# Patient Record
Sex: MALE | Race: WHITE | NOT HISPANIC OR LATINO | Employment: OTHER | URBAN - NONMETROPOLITAN AREA
[De-identification: names, ages, dates, MRNs, and addresses within clinical notes are randomized per-mention and may not be internally consistent; named-entity substitution may affect disease eponyms.]

---

## 2017-02-08 ENCOUNTER — CLINICAL SUPPORT (OUTPATIENT)
Dept: CARDIOLOGY | Facility: CLINIC | Age: 82
End: 2017-02-08

## 2017-02-08 DIAGNOSIS — Z95.0 CARDIAC PACEMAKER IN SITU: Primary | ICD-10-CM

## 2017-02-08 DIAGNOSIS — I49.5 SICK SINUS SYNDROME (HCC): ICD-10-CM

## 2017-02-08 PROCEDURE — 93294 REM INTERROG EVL PM/LDLS PM: CPT | Performed by: INTERNAL MEDICINE

## 2017-02-08 PROCEDURE — 93296 REM INTERROG EVL PM/IDS: CPT | Performed by: INTERNAL MEDICINE

## 2017-02-08 NOTE — PROGRESS NOTES
Dual Chamber Pacemaker Evaluation Report  [facility]    February 8, 2017    Primary Cardiologist: Radha  : Medtronic Model: EnRhythm  Implant date: February 7, 2011    Reason for evaluation:routine  Indication for pacemaker: sick sinus syndrome    Measurements  Atrial sensing - P wave: 0.4 mV  Atrial threshold: n/r  Atrial lead impedance: 316 ohms  Ventricular sensing - R wave: 1.4 mV  Ventricular threshold: n/r  Ventricular lead impedance:   352 ohms     Diagnostic Data  Atrial paced: 84.8 %  Ventricular paced: <0.2 %  Other: no new events  Battery status: intensify follow up   2.95V      Final Parameters  Mode:  AAIR+  Lower rate: 60 bpm   Upper rate: 100 bpm  AV Delay: paced- 180 ms  Sensed-150 ms  Atrial - Amplitude: 2 V   Pulse width: 0.4 ms   Sensitivity: 0.15 mV     Ventricular - Amplitude: 3 V  Pulse width: 0.4 ms  Sensitivity: 0.6 mV    Changes made: n/a  Conclusions: normal pacemaker function and stable sensing thresholds    Follow up: 2 months     Reviewed and agree with above.

## 2017-04-17 ENCOUNTER — CLINICAL SUPPORT (OUTPATIENT)
Dept: CARDIOLOGY | Facility: CLINIC | Age: 82
End: 2017-04-17

## 2017-04-17 DIAGNOSIS — Z95.0 CARDIAC PACEMAKER IN SITU: Primary | ICD-10-CM

## 2017-04-17 DIAGNOSIS — I49.5 SICK SINUS SYNDROME (HCC): ICD-10-CM

## 2017-04-17 PROCEDURE — 93296 REM INTERROG EVL PM/IDS: CPT | Performed by: INTERNAL MEDICINE

## 2017-04-17 PROCEDURE — 93294 REM INTERROG EVL PM/LDLS PM: CPT | Performed by: INTERNAL MEDICINE

## 2017-04-17 NOTE — PROGRESS NOTES
Dual Chamber Pacemaker Evaluation Report  [facility]    April 17, 2017    Primary Cardiologist: Radha  : Medtronic Model: EnRhythm  Implant date: February 7, 2011    Reason for evaluation:battery check  Indication for pacemaker: sick sinus syndrome and a-fib    Measurements  Atrial sensing - P wave: 0.3 mV  Atrial threshold: n/r  Atrial lead impedance: 300 ohms  Ventricular sensing - R wave: 1.4 mV  Ventricular threshold: n/r  Ventricular lead impedance:   356 ohms     Diagnostic Data  Atrial paced: 82.4 %  Ventricular paced: <0.2 %  Other: no new events  Battery status: intensify follow up   2.93V      Final Parameters  Mode:  AAIR+  Lower rate: 60 bpm   Upper rate: 100 bpm  AV Delay: paced- 180 ms  Sensed-150 ms  Atrial - Amplitude: 2 V   Pulse width: 0.4 ms   Sensitivity: 0.15 mV     Ventricular - Amplitude: 3 V  Pulse width: 0.4 ms  Sensitivity: 0.6 mV    Changes made: n/a  Conclusions: normal pacemaker function and stable sensing thresholds    Follow up: 2 months

## 2017-05-11 ENCOUNTER — OFFICE VISIT (OUTPATIENT)
Dept: CARDIOLOGY | Facility: CLINIC | Age: 82
End: 2017-05-11

## 2017-05-11 VITALS
SYSTOLIC BLOOD PRESSURE: 160 MMHG | DIASTOLIC BLOOD PRESSURE: 80 MMHG | BODY MASS INDEX: 19.1 KG/M2 | WEIGHT: 126 LBS | HEART RATE: 77 BPM | HEIGHT: 68 IN

## 2017-05-11 DIAGNOSIS — I25.10 CORONARY ARTERY DISEASE INVOLVING NATIVE CORONARY ARTERY OF NATIVE HEART WITHOUT ANGINA PECTORIS: Primary | ICD-10-CM

## 2017-05-11 DIAGNOSIS — I48.20 CHRONIC ATRIAL FIBRILLATION (HCC): ICD-10-CM

## 2017-05-11 DIAGNOSIS — I10 ESSENTIAL HYPERTENSION: ICD-10-CM

## 2017-05-11 DIAGNOSIS — Z86.79 H/O SICK SINUS SYNDROME: ICD-10-CM

## 2017-05-11 PROCEDURE — 93000 ELECTROCARDIOGRAM COMPLETE: CPT | Performed by: INTERNAL MEDICINE

## 2017-05-11 PROCEDURE — 99213 OFFICE O/P EST LOW 20 MIN: CPT | Performed by: INTERNAL MEDICINE

## 2017-06-12 ENCOUNTER — CLINICAL SUPPORT (OUTPATIENT)
Dept: CARDIOLOGY | Facility: CLINIC | Age: 82
End: 2017-06-12

## 2017-06-12 DIAGNOSIS — Z95.0 CARDIAC PACEMAKER IN SITU: Primary | ICD-10-CM

## 2017-06-12 DIAGNOSIS — I48.20 CHRONIC ATRIAL FIBRILLATION (HCC): ICD-10-CM

## 2017-06-12 DIAGNOSIS — I49.5 SSS (SICK SINUS SYNDROME) (HCC): ICD-10-CM

## 2017-06-12 PROCEDURE — 93288 INTERROG EVL PM/LDLS PM IP: CPT | Performed by: INTERNAL MEDICINE

## 2017-06-12 NOTE — PROGRESS NOTES
Dual Chamber Pacemaker Evaluation Report  In office    June 12, 2017    Primary Cardiologist: Radha  : Medtronic Model: EnRhythm  Implant date: Feburary 7, 2011    Reason for evaluation: battery check  Indication for pacemaker: sick sinus syndrome and a-fib    Measurements  n/r    Diagnostic Data  Atrial paced: 69.5 %  Ventricular paced: 2.6 %  Other: 67 a-fib episodes- longest 4 min   meds include coumadin  Battery status: elective replacement time   Triggered May 16, 2017      Final Parameters  Mode:  VVI  Lower rate: 65 bpm     Atrial - Amplitude: n/a V   Pulse width: n/a ms   Sensitivity: 0.15 mV     Ventricular - Amplitude: 3 V  Pulse width: 0.4 ms  Sensitivity: 0.6 mV    Changes made: none  Conclusions: battery at elective replacement voltage    Follow up: for generator change

## 2017-06-19 ENCOUNTER — HOSPITAL ENCOUNTER (OUTPATIENT)
Dept: CARDIOLOGY | Facility: HOSPITAL | Age: 82
Discharge: HOME | End: 2017-06-19
Admitting: INTERNAL MEDICINE

## 2017-06-19 DIAGNOSIS — Z86.79 H/O SICK SINUS SYNDROME: Primary | ICD-10-CM

## 2017-06-19 LAB
ALBUMIN SERPL-MCNC: 3.6 G/DL (ref 3.5–5)
ALBUMIN/GLOB SERPL: 1.1 G/DL (ref 1.1–2.5)
ALP SERPL-CCNC: 90 U/L (ref 24–120)
ALT SERPL W P-5'-P-CCNC: 37 U/L (ref 0–54)
ANION GAP SERPL CALCULATED.3IONS-SCNC: 12 MMOL/L (ref 4–13)
AST SERPL-CCNC: 48 U/L (ref 7–45)
BILIRUB SERPL-MCNC: 1 MG/DL (ref 0.1–1)
BUN BLD-MCNC: 23 MG/DL (ref 5–21)
BUN/CREAT SERPL: 18.3 (ref 7–25)
CALCIUM SPEC-SCNC: 9.2 MG/DL (ref 8.4–10.4)
CHLORIDE SERPL-SCNC: 111 MMOL/L (ref 98–110)
CO2 SERPL-SCNC: 24 MMOL/L (ref 24–31)
CREAT BLD-MCNC: 1.26 MG/DL (ref 0.5–1.4)
DEPRECATED RDW RBC AUTO: 52.8 FL (ref 40–54)
ERYTHROCYTE [DISTWIDTH] IN BLOOD BY AUTOMATED COUNT: 15.3 % (ref 12–15)
GFR SERPL CREATININE-BSD FRML MDRD: 55 ML/MIN/1.73
GLOBULIN UR ELPH-MCNC: 3.3 GM/DL
GLUCOSE BLD-MCNC: 87 MG/DL (ref 70–100)
HCT VFR BLD AUTO: 34.3 % (ref 40–52)
HGB BLD-MCNC: 10.8 G/DL (ref 14–18)
MCH RBC QN AUTO: 29.9 PG (ref 28–32)
MCHC RBC AUTO-ENTMCNC: 31.5 G/DL (ref 33–36)
MCV RBC AUTO: 95 FL (ref 82–95)
PLATELET # BLD AUTO: 211 10*3/MM3 (ref 130–400)
PMV BLD AUTO: 10.5 FL (ref 6–12)
POTASSIUM BLD-SCNC: 4.2 MMOL/L (ref 3.5–5.3)
PROT SERPL-MCNC: 6.9 G/DL (ref 6.3–8.7)
RBC # BLD AUTO: 3.61 10*6/MM3 (ref 4.8–5.9)
SODIUM BLD-SCNC: 147 MMOL/L (ref 135–145)
WBC NRBC COR # BLD: 5.84 10*3/MM3 (ref 4.8–10.8)

## 2017-06-19 PROCEDURE — 36415 COLL VENOUS BLD VENIPUNCTURE: CPT

## 2017-06-19 PROCEDURE — 80053 COMPREHEN METABOLIC PANEL: CPT | Performed by: INTERNAL MEDICINE

## 2017-06-19 PROCEDURE — 85027 COMPLETE CBC AUTOMATED: CPT | Performed by: INTERNAL MEDICINE

## 2017-06-21 ENCOUNTER — HOSPITAL ENCOUNTER (OUTPATIENT)
Facility: HOSPITAL | Age: 82
Setting detail: HOSPITAL OUTPATIENT SURGERY
Discharge: HOME | End: 2017-06-21
Attending: INTERNAL MEDICINE | Admitting: INTERNAL MEDICINE

## 2017-06-21 VITALS
BODY MASS INDEX: 19.55 KG/M2 | OXYGEN SATURATION: 94 % | HEART RATE: 62 BPM | SYSTOLIC BLOOD PRESSURE: 151 MMHG | RESPIRATION RATE: 15 BRPM | WEIGHT: 132 LBS | TEMPERATURE: 98.4 F | HEIGHT: 69 IN | DIASTOLIC BLOOD PRESSURE: 72 MMHG

## 2017-06-21 DIAGNOSIS — I49.5 SSS (SICK SINUS SYNDROME) (HCC): ICD-10-CM

## 2017-06-21 LAB
INR PPP: 1.1 (ref 0.91–1.09)
PROTHROMBIN TIME: 13.5 SECONDS (ref 10–13.8)

## 2017-06-21 PROCEDURE — 25010000002 MIDAZOLAM PER 1 MG: Performed by: INTERNAL MEDICINE

## 2017-06-21 PROCEDURE — 99152 MOD SED SAME PHYS/QHP 5/>YRS: CPT | Performed by: INTERNAL MEDICINE

## 2017-06-21 PROCEDURE — 25010000002 FENTANYL CITRATE (PF) 100 MCG/2ML SOLUTION: Performed by: INTERNAL MEDICINE

## 2017-06-21 PROCEDURE — 33228 REMV&REPLC PM GEN DUAL LEAD: CPT | Performed by: INTERNAL MEDICINE

## 2017-06-21 PROCEDURE — C1785 PMKR, DUAL, RATE-RESP: HCPCS | Performed by: INTERNAL MEDICINE

## 2017-06-21 PROCEDURE — S0260 H&P FOR SURGERY: HCPCS | Performed by: INTERNAL MEDICINE

## 2017-06-21 PROCEDURE — 85610 PROTHROMBIN TIME: CPT

## 2017-06-21 DEVICE — GEN PM ADVISA SURESCAN DR MRI: Type: IMPLANTABLE DEVICE | Status: FUNCTIONAL

## 2017-06-21 RX ORDER — SODIUM CHLORIDE 9 MG/ML
50 INJECTION, SOLUTION INTRAVENOUS CONTINUOUS
Status: DISCONTINUED | OUTPATIENT
Start: 2017-06-21 | End: 2017-06-21 | Stop reason: HOSPADM

## 2017-06-21 RX ORDER — FENTANYL CITRATE 50 UG/ML
INJECTION, SOLUTION INTRAMUSCULAR; INTRAVENOUS AS NEEDED
Status: DISCONTINUED | OUTPATIENT
Start: 2017-06-21 | End: 2017-06-21 | Stop reason: HOSPADM

## 2017-06-21 RX ORDER — MIDAZOLAM HYDROCHLORIDE 1 MG/ML
INJECTION INTRAMUSCULAR; INTRAVENOUS AS NEEDED
Status: DISCONTINUED | OUTPATIENT
Start: 2017-06-21 | End: 2017-06-21 | Stop reason: HOSPADM

## 2017-06-21 RX ORDER — SODIUM CHLORIDE 0.9 % (FLUSH) 0.9 %
1-10 SYRINGE (ML) INJECTION AS NEEDED
Status: DISCONTINUED | OUTPATIENT
Start: 2017-06-21 | End: 2017-06-21 | Stop reason: HOSPADM

## 2017-06-21 RX ORDER — LIDOCAINE HYDROCHLORIDE 20 MG/ML
INJECTION, SOLUTION INFILTRATION; PERINEURAL AS NEEDED
Status: DISCONTINUED | OUTPATIENT
Start: 2017-06-21 | End: 2017-06-21 | Stop reason: HOSPADM

## 2017-06-21 RX ADMIN — SODIUM CHLORIDE 50 ML/HR: 9 INJECTION, SOLUTION INTRAVENOUS at 11:42

## 2017-06-21 RX ADMIN — CEFAZOLIN 1 G: 1 INJECTION, POWDER, FOR SOLUTION INTRAMUSCULAR; INTRAVENOUS; PARENTERAL at 13:17

## 2017-06-21 RX ADMIN — Medication: at 11:42

## 2017-06-21 NOTE — H&P
"Chief Complaint:  SSS - pacemaker generator at [facility]    HPI: Patient with SSS and pacer in place - generator at [facility] - no other complaints or changes since I last saw on 5/11/2017.    I have reviewed all elements of the patient's past medical, past surgical, home medications, allergies, family and social history with the patient and updated these in the medical record as needed.    I personally performed a 14 point review of systems and found this to be negative except as otherwise noted in the HPI.    O:  /68 (BP Location: Left arm, Patient Position: Lying)  Pulse 65  Temp 98.4 °F (36.9 °C) (Temporal Artery )   Resp 16  Ht 69\" (175.3 cm)  Wt 132 lb (59.9 kg)  SpO2 94%  BMI 19.49 kg/m2    Gen.: Awake alert and oriented ×3 and in no acute distress  HEENT: Normocephalic, atraumatic, pupils equally round and reactive to light, oropharynx clear, mucous membranes moist  Neck: Supple, no elevation of JVP, no thyromegaly, no carotid bruits  CV: Regular rate and rhythm, no audible murmurs, rubs, gallops  Pulmonary: Clear to auscultation bilaterally, no wheezes, rales  GI: Soft, nontender, nondistended, active bowel sounds  Extremities: Warm and well-perfused, no dermatitis or ulceration, no clubbing, cyanosis, edema  Neurologic: Cranial nerves are grossly intact, patient moves all 4 extremities equally during examination    Diagnostic Data:    Lab Results   Component Value Date    WBC 5.84 06/19/2017    HGB 10.8 (L) 06/19/2017    HCT 34.3 (L) 06/19/2017    MCV 95.0 06/19/2017     06/19/2017     Lab Results   Component Value Date    GLUCOSE 87 06/19/2017    CALCIUM 9.2 06/19/2017     (H) 06/19/2017    K 4.2 06/19/2017    CO2 24.0 06/19/2017     (H) 06/19/2017    BUN 23 (H) 06/19/2017    CREATININE 1.26 06/19/2017    EGFRIFNONA 55 (L) 06/19/2017    BCR 18.3 06/19/2017    ANIONGAP 12.0 06/19/2017     INR 1.1    ASSESSMENT/PLAN:    1. SSS  2. Chronic atrial fib  3. CAD    - Will proceed with " generator change today as planned.

## 2017-08-03 ENCOUNTER — CLINICAL SUPPORT (OUTPATIENT)
Dept: CARDIOLOGY | Facility: CLINIC | Age: 82
End: 2017-08-03

## 2017-08-03 DIAGNOSIS — Z95.0 CARDIAC PACEMAKER IN SITU: Primary | ICD-10-CM

## 2017-08-03 DIAGNOSIS — Z86.79 H/O SICK SINUS SYNDROME: ICD-10-CM

## 2017-08-03 PROCEDURE — 93280 PM DEVICE PROGR EVAL DUAL: CPT | Performed by: INTERNAL MEDICINE

## 2017-08-04 NOTE — PROGRESS NOTES
Dual Chamber Pacemaker Evaluation Report  In office    August 3, 2017    Primary Cardiologist: Radha  : Medtronic Model: Advisa  Implant date: June 21, 2017    Reason for evaluation: generator change follow up  Indication for pacemaker: sick sinus syndrome    Measurements  Atrial sensing - P wave: 0.5 mV  Atrial threshold: 0.5V@ 0.4ms  Atrial lead impedance: 285 ohms  Ventricular sensing - R wave: 1.3 mV  Ventricular threshold: 1.25 V @ 0.4 ms  Ventricular lead impedance:   380 ohms     Diagnostic Data  Atrial paced: 98 %  Ventricular paced: 2.3 %  Other: no new events   Incision WNL  Battery status: satisfactory   Est 9.5 years      Final Parameters  Mode:  AAIR+  Lower rate: 60 bpm   Upper rate: 130 bpm  AV Delay: paced- 180 ms  Sensed-150 ms  Atrial - Amplitude: 1.5 V   Pulse width: 0.4 ms   Sensitivity: 0.3 mV     Ventricular - Amplitude: 2.5 V  Pulse width: 0.4 ms  Sensitivity: 0.9 mV    Changes made: changed RV amplitude to 2.5V  Conclusions: normal pacemaker function and stable pacing and sensing thresholds    Follow up: 6 months

## 2018-01-01 ENCOUNTER — CLINICAL SUPPORT (OUTPATIENT)
Dept: CARDIOLOGY | Facility: CLINIC | Age: 83
End: 2018-01-01

## 2018-01-01 ENCOUNTER — OUTSIDE FACILITY SERVICE (OUTPATIENT)
Dept: CARDIOLOGY | Facility: CLINIC | Age: 83
End: 2018-01-01

## 2018-01-01 ENCOUNTER — CLINICAL SUPPORT NO REQUIREMENTS (OUTPATIENT)
Dept: CARDIOLOGY | Facility: CLINIC | Age: 83
End: 2018-01-01

## 2018-01-01 DIAGNOSIS — Z86.79 H/O SICK SINUS SYNDROME: ICD-10-CM

## 2018-01-01 PROCEDURE — 93294 REM INTERROG EVL PM/LDLS PM: CPT | Performed by: PHYSICIAN ASSISTANT

## 2018-01-01 PROCEDURE — 93296 REM INTERROG EVL PM/IDS: CPT | Performed by: PHYSICIAN ASSISTANT

## 2018-01-01 PROCEDURE — 93306 TTE W/DOPPLER COMPLETE: CPT | Performed by: INTERNAL MEDICINE

## 2018-02-08 NOTE — PROGRESS NOTES
Dual Chamber Pacemaker Evaluation Report  [facility]    February 8, 2018    Primary Cardiologist: Radha  : Medtronic Model: Advisa  Implant date: 6/21/17    Reason for evaluation: routine  Indication for pacemaker: sick sinus syndrome    Measurements  Atrial sensing - P wave: 0.6 mV  Atrial threshold: 0.625 V@ 0.4 ms  Atrial lead impedance: 285 ohms  Ventricular sensing - R wave: 2.0 mV  Ventricular threshold: 1.0 V @ 0.4 ms  Ventricular lead impedance:   380 ohms     Diagnostic Data  Atrial paced: 96.1 %  Ventricular paced: 1.7 %  Other: Several episodes of AF noted.  Rates controlled.  Pt anticoagulated.  Battery status: satisfactory         Final Parameters  Mode:  AAIR+  Lower rate: 60 bpm   Upper rate: 130 bpm  AV Delay: paced- 180 ms  Sensed-150 ms  Atrial - Amplitude: 1.5 V   Pulse width: 0.4 ms   Sensitivity: 0.3 mV     Ventricular - Amplitude: 2.25 V  Pulse width: 0.4 ms  Sensitivity: 0.9 mV    Changes made: n/a  Conclusions: normal pacemaker function    Follow up: 6 months

## 2018-08-16 NOTE — PROGRESS NOTES
Dual Chamber Pacemaker Evaluation Report  [facility]    August 16, 2018    Primary Cardiologist: Radha  : Medtronic Model: Advisa   Implant date: 6/21/17    Reason for evaluation: routine  Indication for pacemaker: sick sinus syndrome    Measurements  Atrial sensing - P wave: 0.8 mV  Atrial threshold: n/r  Atrial lead impedance: 285 ohms  Ventricular sensing - R wave: 2.3 mV  Ventricular threshold: 1.25 V @ 0.4 ms  Ventricular lead impedance:   399 ohms     Diagnostic Data  Atrial paced: 14.0 %  Ventricular paced: 4.0 %  Other: 86.4% AF burden.  Many episodes greater than 99 hours.  Multiple fast V rates.  Pt anticoagulated.  Battery status: satisfactory         Final Parameters  Mode:  AAIR+  Lower rate: 60 bpm   Upper rate: 130 bpm  AV Delay: paced- 180 ms  Sensed-150 ms  Atrial - Amplitude: 1.5 V   Pulse width: 0.4 ms   Sensitivity: 0.3 mV     Ventricular - Amplitude: 2.75 V  Pulse width: 0.4 ms  Sensitivity: 0.9 mV    Changes made: n/a  Conclusions: normal pacemaker function    Follow up: 3 months

## (undated) DEVICE — 3M™ IOBAN™ 2 ANTIMICROBIAL INCISE DRAPE 6650EZ: Brand: IOBAN™ 2

## (undated) DEVICE — ELECTRD PAD DEFIB A/

## (undated) DEVICE — SKIN AFFIX SURG ADHESIVE 72/CS 0.55ML: Brand: MEDLINE

## (undated) DEVICE — SOL NS 500ML

## (undated) DEVICE — CABL BIPOL W/ALLGTR CLIP/SM 12FT

## (undated) DEVICE — PK PM 30

## (undated) DEVICE — GOLDVAC PUSH BUTTON ELECTROSURGICAL SMOKE EVACUATION HANDPIECE: Brand: GOLDVAC

## (undated) DEVICE — SOL IRR NACL 0.9PCT BT 1000ML